# Patient Record
Sex: MALE | Race: ASIAN | NOT HISPANIC OR LATINO | Employment: FULL TIME | URBAN - METROPOLITAN AREA
[De-identification: names, ages, dates, MRNs, and addresses within clinical notes are randomized per-mention and may not be internally consistent; named-entity substitution may affect disease eponyms.]

---

## 2017-08-29 NOTE — PROGRESS NOTES
SUBJECTIVE:   Juan A Viramontes is a 46 year old male who presents to clinic today for the following health issues:      Joint Pain    Onset: 2+ months    Description:   Location: bilateral ankle pain right ankle pain has increased  Character: in the morning sharp pain --turns to dull ache throughout the day    Intensity: moderate    Progression of Symptoms: worse    Accompanying Signs & Symptoms:  Other symptoms: none    History:   Previous similar pain: no       Precipitating factors:   Trauma or overuse: no     Alleviating factors:  Improved by: nothing    Therapies Tried and outcome: none    Was seen at [facility]. Physical therapy did not help.           Social History   Substance Use Topics     Smoking status: Never Smoker     Smokeless tobacco: Not on file     Alcohol use No          Problem list and histories reviewed & adjusted, as indicated.  Additional history: as documented    BP Readings from Last 3 Encounters:   08/31/17 101/66   06/06/11 118/74   06/07/10 126/84    Wt Readings from Last 3 Encounters:   08/31/17 177 lb (80.3 kg)   06/07/10 175 lb (79.4 kg)   12/09/09 176 lb 9.6 oz (80.1 kg)                      Reviewed and updated as needed this visit by clinical staffTobacco  Allergies  Meds       Reviewed and updated as needed this visit by Provider         All other systems negative except as outline above  OBJECTIVE:  B/l heel pain.  FEET: tenderness to the inferomedial aspect of the effected heel(s)at the insertion of the plantar fascia.  EXTREMITIES: Tenderness over effected achilles tendon  and bursa without loss of integrity.  CMS intact.    Juan A was seen today for musculoskeletal problem.    Diagnoses and all orders for this visit:    Plantar fasciitis  -     XR Calcaneus Bilateral G/E 2 Views; Future  -     diclofenac (VOLTAREN) 75 MG EC tablet; Take 1 tablet (75 mg) by mouth 2 times daily as needed for moderate pain  -     order for DME; Heel lift    CARDIOVASCULAR SCREENING; LDL  GOAL LESS THAN 130  -     Lipid panel reflex to direct LDL; Future    Achilles tendinitis of both lower extremities  -     diclofenac (VOLTAREN) 75 MG EC tablet; Take 1 tablet (75 mg) by mouth 2 times daily as needed for moderate pain  -     order for DME; Heel lift      Advised supportive and symptomatic treatment.  Follow up with Provider - if condition persists or worsens.

## 2017-08-31 ENCOUNTER — RADIANT APPOINTMENT (OUTPATIENT)
Dept: GENERAL RADIOLOGY | Facility: CLINIC | Age: 47
End: 2017-08-31
Attending: PHYSICIAN ASSISTANT
Payer: COMMERCIAL

## 2017-08-31 ENCOUNTER — OFFICE VISIT (OUTPATIENT)
Dept: FAMILY MEDICINE | Facility: CLINIC | Age: 47
End: 2017-08-31
Payer: COMMERCIAL

## 2017-08-31 VITALS
BODY MASS INDEX: 26.83 KG/M2 | WEIGHT: 177 LBS | HEART RATE: 54 BPM | SYSTOLIC BLOOD PRESSURE: 101 MMHG | RESPIRATION RATE: 18 BRPM | TEMPERATURE: 98.2 F | DIASTOLIC BLOOD PRESSURE: 66 MMHG | OXYGEN SATURATION: 99 % | HEIGHT: 68 IN

## 2017-08-31 DIAGNOSIS — M72.2 PLANTAR FASCIITIS: Primary | ICD-10-CM

## 2017-08-31 DIAGNOSIS — M76.62 ACHILLES TENDINITIS OF BOTH LOWER EXTREMITIES: ICD-10-CM

## 2017-08-31 DIAGNOSIS — M76.61 ACHILLES TENDINITIS OF BOTH LOWER EXTREMITIES: ICD-10-CM

## 2017-08-31 DIAGNOSIS — M72.2 PLANTAR FASCIITIS: ICD-10-CM

## 2017-08-31 DIAGNOSIS — Z13.6 CARDIOVASCULAR SCREENING; LDL GOAL LESS THAN 130: ICD-10-CM

## 2017-08-31 PROCEDURE — 99213 OFFICE O/P EST LOW 20 MIN: CPT | Performed by: PHYSICIAN ASSISTANT

## 2017-08-31 PROCEDURE — 73650 X-RAY EXAM OF HEEL: CPT | Mod: RT

## 2017-08-31 RX ORDER — DICLOFENAC SODIUM 75 MG/1
75 TABLET, DELAYED RELEASE ORAL 2 TIMES DAILY PRN
Qty: 30 TABLET | Refills: 1 | Status: SHIPPED | OUTPATIENT
Start: 2017-08-31 | End: 2018-07-05

## 2017-08-31 NOTE — MR AVS SNAPSHOT
"              After Visit Summary   8/31/2017    Juan A Viramontes    MRN: [id number]           Patient Information     Date Of Birth          1970        Visit Information        Provider Department      8/31/2017 8:00 AM Pedro Luis Rivera PA-C [facility]        Today's Diagnoses     Plantar fasciitis    -  1    CARDIOVASCULAR SCREENING; LDL GOAL LESS THAN 130        Achilles tendinitis of both lower extremities           Follow-ups after your visit        Future tests that were ordered for you today     Open Future Orders        Priority Expected Expires Ordered    Lipid panel reflex to direct LDL Routine  8/31/2018 8/31/2017    XR Calcaneus Bilateral G/E 2 Views Routine 8/31/2017 8/31/2018 8/31/2017            Who to contact     Normal or non-critical lab and imaging results will be communicated to you by [employer], letter or phone within 4 business days after the clinic has received the results. If you do not hear from us within 7 days, please contact the clinic through Exper[employer]ity[employer] or phone. If you have a critical or abnormal lab result, we will notify you by phone as soon as possible.  Submit refill requests through [employer] or call your pharmacy and they will forward the refill request to us. Please allow 3 business days for your refill to be completed.          If you need to speak with a  for additional information , please call: [phone]             Additional Information About Your Visit        [employer] Information     [employer] lets you send messages to your doctor, view your test results, renew your prescriptions, schedule appointments and more. To sign up, go to [email or web address] . Click on \"Log in\" on the left side of the screen, which will take you to the Welcome page. Then click on \"Sign up Now\" on the right side of the page.     You will be asked to enter the access code listed below, as well as some personal information. Please follow the directions to create " "your username and password.     Your access code is: [email or web address]  Expires: 2017  8:48 AM     Your access code will  in 90 days. If you need help or a new code, please call your [facility] or [phone].        Care EveryWhere ID     This is your Care EveryWhere ID. This could be used by other organizations to access your [facility] medical records  [id number]        Your Vitals Were     Pulse Temperature Respirations Height Pulse Oximetry BMI (Body Mass Index)    54 98.2  F (36.8  C) (Tympanic) 18 5' 8\" (1.727 m) 99% 26.91 kg/m2       Blood Pressure from Last 3 Encounters:   17 101/66   11 118/74   06/07/10 126/84    Weight from Last 3 Encounters:   17 177 lb (80.3 kg)   06/07/10 175 lb (79.4 kg)   09 176 lb 9.6 oz (80.1 kg)                 Today's Medication Changes          These changes are accurate as of: 17  8:48 AM.  If you have any questions, ask your nurse or doctor.               Start taking these medicines.        Dose/Directions    diclofenac 75 MG EC tablet   Commonly known as:  VOLTAREN   Used for:  Plantar fasciitis, Achilles tendinitis of both lower extremities   Started by:  Pedro Luis Rivera PA-C        Dose:  75 mg   Take 1 tablet (75 mg) by mouth 2 times daily as needed for moderate pain   Quantity:  30 tablet   Refills:  1       order for DME   Used for:  Achilles tendinitis of both lower extremities, Plantar fasciitis   Started by:  Pedro Luis Rivera PA-C        Heel lift   Quantity:  2 Device   Refills:  0         Stop taking these medicines if you haven't already. Please contact your care team if you have questions.     ibuprofen 800 MG tablet   Commonly known as:  ADVIL/MOTRIN   Stopped by:  Pedro Luis Rivera PA-C           naproxen 500 MG tablet   Commonly known as:  NAPROSYN   Stopped by:  Pedro Luis Rivera PA-C           NO ACTIVE MEDICATIONS   Stopped by:  Pedro Luis Rivera PA-C           simvastatin 20 MG tablet   Commonly known as:  " ZOCOR   Stopped by:  Pedro Luis Rivera PA-C                Where to get your medicines      These medications were sent to [id number] [place] - [address]  [address], [place] [address]     Phone:  [phone]     diclofenac 75 MG EC tablet         Some of these will need a paper prescription and others can be bought over the counter.  Ask your nurse if you have questions.     Bring a paper prescription for each of these medications     order for DME                Primary Care Provider Office Phone # Fax #    Charles Gtz [phone]       [facility] [address] [facility] [address]        Equal Access to Services     DOUGLAS CARRANZA : Hadii ponce stein hadasho Soomaali, waaxda luqadaha, qaybta kaalmada adeegyada, ana tineo . So [facility] [phone].    ATENCIÓN: Si habla español, tiene a cooper disposición servicios gratuitos de asistencia lingüística. Llame al [phone].    We comply with applicable federal civil rights laws and [place] laws. We do not discriminate on the basis of race, color, national origin, age, disability sex, sexual orientation or gender identity.            Thank you!     Thank you for choosing [facility]  for your care. Our goal is always to provide you with excellent care. Hearing back from our patients is one way we can continue to improve our services. Please take a few minutes to complete the written survey that you may receive in the mail after your visit with us. Thank you!             Your Updated Medication List - Protect others around you: Learn how to safely use, store and throw away your medicines at www.disposemymeds.org.          This list is accurate as of: 8/31/17  8:48 AM.  Always use your most recent med list.                   Brand Name Dispense Instructions for use Diagnosis    diclofenac 75 MG EC tablet    VOLTAREN    30 tablet    Take 1 tablet (75 mg) by mouth 2 times  daily as needed for moderate pain    Plantar fasciitis, Achilles tendinitis of both lower extremities       order for DME     2 Device    Heel lift    Achilles tendinitis of both lower extremities, Plantar fasciitis

## 2018-06-21 ENCOUNTER — OFFICE VISIT (OUTPATIENT)
Dept: FAMILY MEDICINE | Facility: CLINIC | Age: 48
End: 2018-06-21
Payer: COMMERCIAL

## 2018-06-21 ENCOUNTER — RADIANT APPOINTMENT (OUTPATIENT)
Dept: ULTRASOUND IMAGING | Facility: CLINIC | Age: 48
End: 2018-06-21
Attending: FAMILY MEDICINE
Payer: COMMERCIAL

## 2018-06-21 VITALS
HEART RATE: 94 BPM | DIASTOLIC BLOOD PRESSURE: 92 MMHG | WEIGHT: 173 LBS | BODY MASS INDEX: 26.22 KG/M2 | RESPIRATION RATE: 16 BRPM | SYSTOLIC BLOOD PRESSURE: 132 MMHG | TEMPERATURE: 98.1 F | HEIGHT: 68 IN | OXYGEN SATURATION: 99 %

## 2018-06-21 DIAGNOSIS — N45.1 EPIDIDYMITIS: ICD-10-CM

## 2018-06-21 DIAGNOSIS — N45.1 EPIDIDYMITIS: Primary | ICD-10-CM

## 2018-06-21 PROCEDURE — 76870 US EXAM SCROTUM: CPT

## 2018-06-21 PROCEDURE — 93976 VASCULAR STUDY: CPT

## 2018-06-21 PROCEDURE — 99213 OFFICE O/P EST LOW 20 MIN: CPT | Performed by: FAMILY MEDICINE

## 2018-06-21 RX ORDER — IBUPROFEN 800 MG/1
800 TABLET, FILM COATED ORAL
Qty: 15 TABLET | Refills: 0 | Status: SHIPPED | OUTPATIENT
Start: 2018-06-21 | End: 2018-06-27

## 2018-06-21 RX ORDER — DOXYCYCLINE 100 MG/1
100 CAPSULE ORAL 2 TIMES DAILY
Qty: 20 CAPSULE | Refills: 0 | Status: SHIPPED | OUTPATIENT
Start: 2018-06-21 | End: 2018-07-05

## 2018-06-21 ASSESSMENT — PAIN SCALES - GENERAL: PAINLEVEL: SEVERE PAIN (7)

## 2018-06-21 NOTE — PROGRESS NOTES
"HPI:    I am seeing Juan A Viramontes for the 1st time. he is a 47 year old male here to discuss:    Right Testicle pain -     Duration: since 6/20/18  Trauma: no  Severity: severe  Quality: sharp  Swelling: no  Urinary symptoms: No dysuria, frequency, d/c or hematuria.  Concern for STD: no  Fevers: no  Better with: rest  Worse with: activity  Plan for today: u/s to rule out torsion. Treat with Doxy. Ibuprofen. Wear supportive underwear.        Exam:    BP (!) 132/92 (Cuff Size: Adult Regular)  Pulse 94  Temp 98.1  F (36.7  C) (Oral)  Resp 16  Ht 5' 8\" (1.727 m)  Wt 173 lb (78.5 kg)  SpO2 99%  BMI 26.3 kg/m2    Gen: Healthy appearing male in no acute distress  Abd: Soft, non tender, non distended, with normal bowel sounds. No liver or spleen enlargement. No masses. No hernias.  : both testicles are descended. No hernias. There is severe tenderness of the right testicle and surrounding soft tissues. No appreciable swelling.      Assessment and Plan - Decision Making    1. Epididymitis  Per HPI  - doxycycline (VIBRAMYCIN) 100 MG capsule; Take 1 capsule (100 mg) by mouth 2 times daily  Dispense: 20 capsule; Refill: 0  - US Testicular & Scrotum w Doppler Ltd; Future  - ibuprofen (ADVIL/MOTRIN) 800 MG tablet; Take 1 tablet (800 mg) by mouth 3 times daily (with meals)  Dispense: 15 tablet; Refill: 0      Written instructions given as follows:    Patient Instructions   1. Doxycycline as prescribed - may cause sun sensitivity.    2. Set up ultrasound - [phone].    3. Let me see you back for a physical with fasting labs.      "

## 2018-06-21 NOTE — NURSING NOTE
"Chief Complaint   Patient presents with     Abdominal Pain     Testicular/scrotal Pain       Initial BP (!) 132/92 (Cuff Size: Adult Regular)  Pulse 94  Temp 98.1  F (36.7  C) (Oral)  Resp 16  Ht 5' 8\" (1.727 m)  Wt 173 lb (78.5 kg)  SpO2 99%  BMI 26.3 kg/m2 Estimated body mass index is 26.3 kg/(m^2) as calculated from the following:    Height as of this encounter: 5' 8\" (1.727 m).    Weight as of this encounter: 173 lb (78.5 kg).      Chen Livingston LPN    "

## 2018-06-21 NOTE — PATIENT INSTRUCTIONS
1. Doxycycline as prescribed - may cause sun sensitivity.    2. Set up ultrasound - [phone].    3. Let me see you back for a physical with fasting labs.

## 2018-06-21 NOTE — MR AVS SNAPSHOT
"              After Visit Summary   6/21/2018    Juan A Viramontes    MRN: [id number]           Patient Information     Date Of Birth          1970        Visit Information        Provider Department      6/21/2018 12:30 PM Roge Mccormick MD [facility]        Today's Diagnoses     Epididymitis    -  1      Care Instructions    1. Doxycycline as prescribed - may cause sun sensitivity.    2. Set up ultrasound - [phone].    3. Let me see you back for a physical with fasting labs.          Follow-ups after your visit        Future tests that were ordered for you today     Open Future Orders        Priority Expected Expires Ordered    US Testicular & Scrotum w Doppler Ltd Routine  6/21/2019 6/21/2018            Who to contact     If you have questions or need follow up information about today's clinic visit or your schedule please contact [facility] directly at [phone].  Normal or non-critical lab and imaging results will be communicated to you by MyChart, letter or phone within 4 business days after the clinic has received the results. If you do not hear from us within 7 days, please contact the clinic through MyChart or phone. If you have a critical or abnormal lab result, we will notify you by phone as soon as possible.  Submit refill requests through [employer] or call your pharmacy and they will forward the refill request to us. Please allow 3 business days for your refill to be completed.          Additional Information About Your Visit        Care EveryWhere ID     This is your Care EveryWhere ID. This could be used by other organizations to access your [facility] medical records  [id number]        Your Vitals Were     Pulse Temperature Respirations Height Pulse Oximetry BMI (Body Mass Index)    94 98.1  F (36.7  C) (Oral) 16 5' 8\" (1.727 m) 99% 26.3 kg/m2       Blood Pressure from Last 3 Encounters:   06/21/18 (!) 132/92   08/31/17 101/66   06/06/11 118/74    Weight " from Last 3 Encounters:   06/21/18 173 lb (78.5 kg)   08/31/17 177 lb (80.3 kg)   06/07/10 175 lb (79.4 kg)                 Today's Medication Changes          These changes are accurate as of 6/21/18  1:09 PM.  If you have any questions, ask your nurse or doctor.               Start taking these medicines.        Dose/Directions    doxycycline 100 MG capsule   Commonly known as:  VIBRAMYCIN   Used for:  Epididymitis   Started by:  Roge Mccormick MD        Dose:  100 mg   Take 1 capsule (100 mg) by mouth 2 times daily   Quantity:  20 capsule   Refills:  0            Where to get your medicines      These medications were sent to [id number] - [place], [place] - [address] [facility]  [address], [place] [address]     Phone:  [phone]     doxycycline 100 MG capsule                Primary Care Provider    Charles Gtz MD       No address on file        Equal Access to Services     [facility]: Hadii ponce stein hadasho [place], waaxda luqadaha, qaybta kaalmada adeegyada, ana tineo . So [facility] [phone].    ATENCIÓN: Si habla español, tiene a cooper disposición servicios gratuitos de asistencia lingüística. Llame al [phone].    We comply with applicable federal civil rights laws and [place] laws. We do not discriminate on the basis of race, color, national origin, age, disability, sex, sexual orientation, or gender identity.            Thank you!     Thank you for choosing [facility]  for your care. Our goal is always to provide you with excellent care. Hearing back from our patients is one way we can continue to improve our services. Please take a few minutes to complete the written survey that you may receive in the mail after your visit with us. Thank you!             Your Updated Medication List - Protect others around you: Learn how to safely use, store and throw away your medicines at www.disposemymeds.org.           This list is accurate as of 6/21/18  1:09 PM.  Always use your most recent med list.                   Brand Name Dispense Instructions for use Diagnosis    diclofenac 75 MG EC tablet    VOLTAREN    30 tablet    Take 1 tablet (75 mg) by mouth 2 times daily as needed for moderate pain    Plantar fasciitis, Achilles tendinitis of both lower extremities       doxycycline 100 MG capsule    VIBRAMYCIN    20 capsule    Take 1 capsule (100 mg) by mouth 2 times daily    Epididymitis

## 2018-06-22 ENCOUNTER — TELEPHONE (OUTPATIENT)
Dept: FAMILY MEDICINE | Facility: CLINIC | Age: 48
End: 2018-06-22

## 2018-06-22 NOTE — TELEPHONE ENCOUNTER
Pt notified of provider message as written.  Pt verbalized good understanding. Appointment's scheduled.  Tiana MATTHEWSN, RN

## 2018-06-22 NOTE — TELEPHONE ENCOUNTER
Left message on answering machine for patient to call back to [phone].  Tiana Emerson BSN, RN

## 2018-06-22 NOTE — TELEPHONE ENCOUNTER
Please call patient:    Your ultrasound did not show any torsion (the main reason we did the test).    Small cysts were seen - not worrisome.    See you soon for a physical with fasting labs.    Roge Mccormick M.D.

## 2018-06-27 ENCOUNTER — TELEPHONE (OUTPATIENT)
Dept: FAMILY MEDICINE | Facility: CLINIC | Age: 48
End: 2018-06-27

## 2018-06-27 DIAGNOSIS — N45.1 EPIDIDYMITIS: ICD-10-CM

## 2018-06-27 RX ORDER — IBUPROFEN 800 MG/1
800 TABLET, FILM COATED ORAL
Qty: 15 TABLET | Refills: 0 | Status: SHIPPED | OUTPATIENT
Start: 2018-06-27 | End: 2018-07-05

## 2018-06-27 NOTE — TELEPHONE ENCOUNTER
Pt called RN directly.  He does have fasting lab appointment Friday and ov with Dr. Roge Mccormick on 7/2.  Pt asking for refill of ibuprofen, refill done per RN protocol.      Pt states he pain is a lot less but he still experiences pain when he is not taking ibuprofen. Ibuprofen controls the pain.  Pt asking if he should be concerned or needs to do anything different.  To provider to advise.  Tiana MATTHEWSN, RN

## 2018-06-29 DIAGNOSIS — Z13.6 CARDIOVASCULAR SCREENING; LDL GOAL LESS THAN 130: ICD-10-CM

## 2018-06-29 LAB
CHOLEST SERPL-MCNC: 262 MG/DL
HDLC SERPL-MCNC: 49 MG/DL
LDLC SERPL CALC-MCNC: 170 MG/DL
NONHDLC SERPL-MCNC: 213 MG/DL
TRIGL SERPL-MCNC: 217 MG/DL

## 2018-06-29 PROCEDURE — 36415 COLL VENOUS BLD VENIPUNCTURE: CPT | Performed by: PHYSICIAN ASSISTANT

## 2018-06-29 PROCEDURE — 80061 LIPID PANEL: CPT | Performed by: PHYSICIAN ASSISTANT

## 2018-07-03 ENCOUNTER — OFFICE VISIT (OUTPATIENT)
Dept: FAMILY MEDICINE | Facility: CLINIC | Age: 48
End: 2018-07-03
Payer: COMMERCIAL

## 2018-07-03 VITALS
RESPIRATION RATE: 16 BRPM | SYSTOLIC BLOOD PRESSURE: 119 MMHG | HEART RATE: 86 BPM | WEIGHT: 175 LBS | TEMPERATURE: 97.7 F | DIASTOLIC BLOOD PRESSURE: 76 MMHG | OXYGEN SATURATION: 97 % | BODY MASS INDEX: 26.52 KG/M2 | HEIGHT: 68 IN

## 2018-07-03 DIAGNOSIS — Z00.00 ROUTINE GENERAL MEDICAL EXAMINATION AT A HEALTH CARE FACILITY: Primary | ICD-10-CM

## 2018-07-03 PROCEDURE — 99396 PREV VISIT EST AGE 40-64: CPT | Performed by: FAMILY MEDICINE

## 2018-07-03 NOTE — PROGRESS NOTES
SUBJECTIVE:   CC: Juan A Viramontes is an 47 year old male who presents for preventative health visit.     Physical   Annual:     Getting at least 3 servings of Calcium per day:  Yes    Bi-annual eye exam:  Yes    Dental care twice a year:  NO    Sleep apnea or symptoms of sleep apnea:  None    Diet:  Regular (no restrictions)    Frequency of exercise:  2-3 days/week    Duration of exercise:  15-30 minutes    Taking medications regularly:  Yes    Medication side effects:  None    Additional concerns today:  No      Right Testicle pain - occurred in June 2018.   Evaluation and treatment:    U/s 6/21/18 as below.   Resolved with Ibuprofen and Doxy.    IMPRESSION: Small bilateral epididymal cysts. Otherwise unremarkable  exam.    Dyslipidemia - no h/o vascular disease like CAD, PAD, CVA and no h/o diabetes.   Evaluation and treatment:    Per ATP, no statin recommended.   Counseling done today regarding healthy weight, diet and exercise.     Recent Labs   Lab Test  06/29/18   0729   CHOL  262*   HDL  49   LDL  170*   TRIG  217*     The 10-year ASCVD risk score (Keyanna LIANA Jr, et al., 2013) is: 3.2%    Values used to calculate the score:      Age: 47 years      Sex: Male      Is Non- : No      Diabetic: No      Tobacco smoker: No      Systolic Blood Pressure: 119 mmHg      Is BP treated: No      HDL Cholesterol: 49 mg/dL      Total Cholesterol: 262 mg/dL    Preventive -     Immunization History   Administered Date(s) Administered     Influenza (IIV3) PF 11/12/2009     -STD screen: declines    -screen diabetes: we had not ordered it with pre-visit labs. I apologized. He is not fasting today so I ordered future BMP.      SH:    Marital status:   Kids: no  Employment: office work  Exercise: elliptical 3 per week about 25-30 min  Tobacco: no  Etoh: no  Recreational drugs: no  Caffeine: coffee 2-3 per week    Written instructions given as follows:    Patient Instructions     Preventive Health  Recommendations  Male Ages 40 to 49    Yearly exam:             See your health care provider every year in order to  o   Review health changes.   o   Discuss preventive care.    o   Review your medicines if your doctor has prescribed any.    You should be tested each year for STDs (sexually transmitted diseases) if you re at risk.     Have a cholesterol test every 5 years.     Have a colonoscopy (test for colon cancer) if someone in your family has had colon cancer or polyps before age 50.     After age 45, have a diabetes test (fasting glucose). If you are at risk for diabetes, you should have this test every 3 years.      Talk with your health care provider about whether or not a prostate cancer screening test (PSA) is right for you.    Shots: Get a flu shot each year. Get a tetanus shot every 10 years.     Nutrition:    Eat at least 5 servings of fruits and vegetables daily.     Eat whole-grain bread, whole-wheat pasta and brown rice instead of white grains and rice.     Get adequate Calcium and Vitamin D.     Lifestyle    Exercise for at least 150 minutes a week (30 minutes a day, 5 days a week). This will help you control your weight and prevent disease.     Limit alcohol to one drink per day.     No smoking.     Wear sunscreen to prevent skin cancer.     See your dentist every six months for an exam and cleaning.            Today's PHQ-2 Score:   PHQ-2 ( 1999 Pfizer) 7/3/2018   Q1: Little interest or pleasure in doing things 0   Q2: Feeling down, depressed or hopeless 0   PHQ-2 Score 0   Q1: Little interest or pleasure in doing things Not at all   Q2: Feeling down, depressed or hopeless Not at all   PHQ-2 Score 0       Abuse: Current or Past(Physical, Sexual or Emotional)- No  Do you feel safe in your environment - Yes    Social History   Substance Use Topics     Smoking status: Never Smoker     Smokeless tobacco: Never Used     Alcohol use No     Alcohol Use 7/3/2018   If you drink alcohol do you typically  "have greater than 3 drinks per day OR greater than 7 drinks per week? Not Applicable   No flowsheet data found.    Last PSA: No results found for: PSA    Reviewed orders with patient. Reviewed health maintenance and updated orders accordingly - Yes        Review of Systems  CONSTITUTIONAL: NEGATIVE for fever, chills, change in weight  INTEGUMENTARY/SKIN: NEGATIVE for worrisome rashes, moles or lesions  EYES: NEGATIVE for vision changes or irritation  ENT: NEGATIVE for ear, mouth and throat problems  RESP: NEGATIVE for significant cough or SOB  CV: NEGATIVE for chest pain, palpitations or peripheral edema  GI: NEGATIVE for nausea, abdominal pain, heartburn, or change in bowel habits   male: negative for dysuria, hematuria, decreased urinary stream, erectile dysfunction, urethral discharge  MUSCULOSKELETAL: NEGATIVE for significant arthralgias or myalgia  NEURO: NEGATIVE for weakness, dizziness or paresthesias  PSYCHIATRIC: NEGATIVE for changes in mood or affect    OBJECTIVE:   /76 (Cuff Size: Adult Regular)  Pulse 86  Temp 97.7  F (36.5  C) (Oral)  Resp 16  Ht 5' 8\" (1.727 m)  Wt 175 lb (79.4 kg)  SpO2 97%  BMI 26.61 kg/m2    Physical Exam  GENERAL: healthy, alert and no distress  EYES: Eyes grossly normal to inspection, PERRL and conjunctivae and sclerae normal  HENT: ear canals and TM's normal, nose and mouth without ulcers or lesions  NECK: no adenopathy, no asymmetry, masses, or scars and thyroid normal to palpation  RESP: lungs clear to auscultation - no rales, rhonchi or wheezes  CV: regular rate and rhythm, normal S1 S2, no S3 or S4, no murmur, click or rub, no peripheral edema and peripheral pulses strong  ABDOMEN: soft, nontender, no hepatosplenomegaly, no masses and bowel sounds normal  MS: no gross musculoskeletal defects noted, no edema  SKIN: no suspicious lesions or rashes  NEURO: Normal strength and tone, mentation intact and speech normal  PSYCH: mentation appears normal, affect " "normal/bright        ASSESSMENT/PLAN:       COUNSELING:   Reviewed preventive health counseling, as reflected in patient instructions       Regular exercise       Healthy diet/nutrition    BP Readings from Last 1 Encounters:   07/03/18 119/76     Estimated body mass index is 26.3 kg/(m^2) as calculated from the following:    Height as of 6/21/18: 5' 8\" (1.727 m).    Weight as of 6/21/18: 173 lb (78.5 kg).         reports that he has never smoked. He has never used smokeless tobacco.      Assessment and Plan - Decision Making    1. Routine general medical examination at a health care facility  Results of today's exam given to patient verbally along with age appropriate preventive measures. Written instructions reviewed and handed to the patient.    - Basic metabolic panel; Future      Written instructions given as follows:    Patient Instructions   1. I recommend including veggies, fresh fruits (3 to 5 servings), nuts (unsalted) in your daily diet. Cut down on carbs like bread, pasta, rice, potatoes. Cut down on red meats like burgers etc. Increase healthy proteins like beans, tofu, tuna, chicken and turkey.    2. Get regular exercise like jogging, biking, swimming at least 3 times per week.      3. See the dentist and eye doctor regularly.     4. Sorry that you have to come back one more time to give fasting blood sample.    5. I will contact you with results - if all is well see you in one year for a physical with fasting labs.                 "

## 2018-07-03 NOTE — MR AVS SNAPSHOT
After Visit Summary   7/3/2018    Juan A Viramontes    MRN: [id number]           Patient Information     Date Of Birth          1970        Visit Information        Provider Department      7/3/2018 6:00 PM Roge Mccormick MD [facility]        Today's Diagnoses     Routine general medical examination at a health care facility    -  1      Care Instructions    1. I recommend including veggies, fresh fruits (3 to 5 servings), nuts (unsalted) in your daily diet. Cut down on carbs like bread, pasta, rice, potatoes. Cut down on red meats like burgers etc. Increase healthy proteins like beans, tofu, tuna, chicken and turkey.    2. Get regular exercise like jogging, biking, swimming at least 3 times per week.      3. See the dentist and eye doctor regularly.     4. Sorry that you have to come back one more time to give fasting blood sample.    5. I will contact you with results - if all is well see you in one year for a physical with fasting labs.                     Follow-ups after your visit        Future tests that were ordered for you today     Open Future Orders        Priority Expected Expires Ordered    Basic metabolic panel Routine  7/3/2019 7/3/2018            Who to contact     If you have questions or need follow up information about today's clinic visit or your schedule please contact [facility] directly at [phone].  Normal or non-critical lab and imaging results will be communicated to you by MyChart, letter or phone within 4 business days after the clinic has received the results. If you do not hear from us within 7 days, please contact the clinic through MyChart or phone. If you have a critical or abnormal lab result, we will notify you by phone as soon as possible.  Submit refill requests through [employer] or call your pharmacy and they will forward the refill request to us. Please allow 3 business days for your refill to be completed.           "Additional Information About Your Visit        Care EveryWhere ID     This is your Care EveryWhere ID. This could be used by other organizations to access your [place] medical records  [id number]        Your Vitals Were     Pulse Temperature Respirations Height Pulse Oximetry BMI (Body Mass Index)    86 97.7  F (36.5  C) (Oral) 16 5' 8\" (1.727 m) 97% 26.61 kg/m2       Blood Pressure from Last 3 Encounters:   07/03/18 119/76   06/21/18 (!) 132/92   08/31/17 101/66    Weight from Last 3 Encounters:   07/03/18 175 lb (79.4 kg)   06/21/18 173 lb (78.5 kg)   08/31/17 177 lb (80.3 kg)               Primary Care Provider Office Phone # Fax #    [facility] [phone] [address] [facility] [address]        Equal Access to Services     DOUGLAS CARRANZA : Hadii ponce ku hadasho Soomaali, waaxda luqadaha, qaybta kaalmada adeegyada, waxay anetain hayrenon julius tineo . So [facility] [phone].    ATENCIÓN: Si habla español, tiene a cooper disposición servicios gratuitos de asistencia lingüística. Llame al [phone].    We comply with applicable federal civil rights laws and [place] laws. We do not discriminate on the basis of race, color, national origin, age, disability, sex, sexual orientation, or gender identity.            Thank you!     Thank you for choosing [facility]  for your care. Our goal is always to provide you with excellent care. Hearing back from our patients is one way we can continue to improve our services. Please take a few minutes to complete the written survey that you may receive in the mail after your visit with us. Thank you!             Your Updated Medication List - Protect others around you: Learn how to safely use, store and throw away your medicines at www.disposemymeds.org.          This list is accurate as of 7/3/18  6:22 PM.  Always use your most recent med list.                   Brand Name Dispense Instructions for use Diagnosis    " diclofenac 75 MG EC tablet    VOLTAREN    30 tablet    Take 1 tablet (75 mg) by mouth 2 times daily as needed for moderate pain    Plantar fasciitis, Achilles tendinitis of both lower extremities       doxycycline 100 MG capsule    VIBRAMYCIN    20 capsule    Take 1 capsule (100 mg) by mouth 2 times daily    Epididymitis       ibuprofen 800 MG tablet    ADVIL/MOTRIN    15 tablet    Take 1 tablet (800 mg) by mouth 3 times daily (with meals)    Epididymitis

## 2018-07-03 NOTE — NURSING NOTE
"Chief Complaint   Patient presents with     Physical       Initial /76 (Cuff Size: Adult Regular)  Pulse 86  Temp 97.7  F (36.5  C) (Oral)  Resp 16  Ht 5' 8\" (1.727 m)  Wt 175 lb (79.4 kg)  SpO2 97%  BMI 26.61 kg/m2 Estimated body mass index is 26.61 kg/(m^2) as calculated from the following:    Height as of this encounter: 5' 8\" (1.727 m).    Weight as of this encounter: 175 lb (79.4 kg).      Chen Livingston LPN    "

## 2018-07-03 NOTE — PATIENT INSTRUCTIONS
1. I recommend including veggies, fresh fruits (3 to 5 servings), nuts (unsalted) in your daily diet. Cut down on carbs like bread, pasta, rice, potatoes. Cut down on red meats like burgers etc. Increase healthy proteins like beans, tofu, tuna, chicken and turkey.    2. Get regular exercise like jogging, biking, swimming at least 3 times per week.      3. See the dentist and eye doctor regularly.     4. Sorry that you have to come back one more time to give fasting blood sample.    5. I will contact you with results - if all is well see you in one year for a physical with fasting labs.

## 2018-07-04 ENCOUNTER — TELEPHONE (OUTPATIENT)
Dept: FAMILY MEDICINE | Facility: CLINIC | Age: 48
End: 2018-07-04

## 2018-07-04 ENCOUNTER — NURSE TRIAGE (OUTPATIENT)
Dept: NURSING | Facility: CLINIC | Age: 48
End: 2018-07-04

## 2018-07-04 DIAGNOSIS — N45.1 EPIDIDYMITIS: ICD-10-CM

## 2018-07-04 DIAGNOSIS — N50.819 TESTICLE PAIN: Primary | ICD-10-CM

## 2018-07-04 NOTE — TELEPHONE ENCOUNTER
"Patient experienced \"pain in testes\" 10 days ago.  Saw PCP, had ultrasound, and was given antibiotics.  Pain resolved.    Pain has returned this morning.   Rates pain as 4-5/10.  Took an Ibuprofen 800 mg 1/2 hour ago.  Has not yet noticed any changes in the pain.  Denies fever  Denies swelling, redness, open sores.  Feels pain is the same as it was previous.    Patient would like to update Dr. Mccormick and wonders how should he proceed?  Does he need more antibiotics?  Should he see a urologist?    Will route a message to Dr. Mccormick to address patient's concerns.  Advised patient to call clinic if he does not hear from PCP by 12 noon tomorrow.    Protocol and care advice reviewed  Caller states understanding of the recommended disposition  Advised to call back if further questions or concerns      Reason for Disposition    [1] Brief pain in scrotum or testicle AND [2] present < 1 hour AND [3] recurrent  (NO swelling)    Additional Information    Negative: SEVERE pain (e.g., excruciating)    Negative: Swollen scrotum    Negative: Vomiting    Negative: [1] Constant pain in scrotum or testicle AND [2] present > 1 hour    Negative: Fever > 100.5 F (38.1 C)    Negative: Patient sounds very sick or weak to the triager    Negative: Scrotum looks infected (e.g., draining sore, ulcer, red rash)    Negative: Blood in urine (red, pink, or tea-colored)    Protocols used: SCROTAL PAIN-ADULT-      "

## 2018-07-04 NOTE — TELEPHONE ENCOUNTER
"Clinic Action Needed:  Yes, please call patient  [facility]NA Triage Call  Presenting Problem:    Patient experienced \"pain in testes\" 10 days ago.  Saw PCP, had ultrasound, and was given antibiotics.  Pain resolved.    Pain has returned this morning.   Rates pain as 4-5/10.  Took an Ibuprofen 800 mg 1/2 hour ago.  Has not yet noticed any changes in the pain.  Denies fever  Denies swelling, redness, open sores.  Feels pain is the same as it was previous.    Patient would like to update Dr. Mccormick and wonders how should he proceed?  Does he need more antibiotics?  Should he see a urologist?    Will route a message to Dr. Mccormick to address patient's concerns.  Advised patient to call clinic if he does not hear from PCP  by 12 noon tomorrow.      Routed to:  Dr. Mccormick /Nurse Diana Chua RN / [place] [facility]    "

## 2018-07-05 ENCOUNTER — TELEPHONE (OUTPATIENT)
Dept: FAMILY MEDICINE | Facility: CLINIC | Age: 48
End: 2018-07-05

## 2018-07-05 RX ORDER — IBUPROFEN 800 MG/1
800 TABLET, FILM COATED ORAL
Qty: 15 TABLET | Refills: 0 | Status: SHIPPED | OUTPATIENT
Start: 2018-07-05

## 2018-07-05 RX ORDER — DOXYCYCLINE 100 MG/1
100 CAPSULE ORAL 2 TIMES DAILY
Qty: 20 CAPSULE | Refills: 0 | Status: SHIPPED | OUTPATIENT
Start: 2018-07-05

## 2018-07-05 NOTE — TELEPHONE ENCOUNTER
Yes see urology - [phone].    In the mean time, repeat the same treatment as before - Ibuprofen and Doxy sent to his pharmacy.    Also give him the message I sent to pool separately.    Roge Mccormick M.D.

## 2018-07-05 NOTE — TELEPHONE ENCOUNTER
Please call patient:    When you come for fasting labs on 7/10/18, please stop by our pharmacy and get Tdap vaccine.    Thanks.    Roge Mccormick M.D.

## 2018-07-05 NOTE — TELEPHONE ENCOUNTER
Pt notified of provider message as written.  Pt verbalized good understanding.  Tiana Emerson BSN, RN

## 2018-07-10 ENCOUNTER — ALLIED HEALTH/NURSE VISIT (OUTPATIENT)
Dept: NURSING | Facility: CLINIC | Age: 48
End: 2018-07-10
Payer: COMMERCIAL

## 2018-07-10 DIAGNOSIS — Z23 NEED FOR VACCINATION: Primary | ICD-10-CM

## 2018-07-10 DIAGNOSIS — Z00.00 ROUTINE GENERAL MEDICAL EXAMINATION AT A HEALTH CARE FACILITY: ICD-10-CM

## 2018-07-10 LAB
ANION GAP SERPL CALCULATED.3IONS-SCNC: 9 MMOL/L (ref 3–14)
BUN SERPL-MCNC: 16 MG/DL (ref 7–30)
CALCIUM SERPL-MCNC: 8.8 MG/DL (ref 8.5–10.1)
CHLORIDE SERPL-SCNC: 105 MMOL/L (ref 94–109)
CO2 SERPL-SCNC: 24 MMOL/L (ref 20–32)
CREAT SERPL-MCNC: 0.93 MG/DL (ref 0.66–1.25)
GFR SERPL CREATININE-BSD FRML MDRD: 88 ML/MIN/1.7M2
GLUCOSE SERPL-MCNC: 101 MG/DL (ref 70–99)
POTASSIUM SERPL-SCNC: 3.9 MMOL/L (ref 3.4–5.3)
SODIUM SERPL-SCNC: 138 MMOL/L (ref 133–144)

## 2018-07-10 PROCEDURE — 80048 BASIC METABOLIC PNL TOTAL CA: CPT | Performed by: FAMILY MEDICINE

## 2018-07-10 PROCEDURE — 90471 IMMUNIZATION ADMIN: CPT

## 2018-07-10 PROCEDURE — 90715 TDAP VACCINE 7 YRS/> IM: CPT

## 2018-07-10 PROCEDURE — 36415 COLL VENOUS BLD VENIPUNCTURE: CPT | Performed by: FAMILY MEDICINE

## 2018-07-10 NOTE — MR AVS SNAPSHOT
After Visit Summary   7/10/2018    Juan A Virmaontes    MRN: [id number]           Patient Information     Date Of Birth          1970        Visit Information        Provider Department      7/10/2018 8:15 AM AN ANCILLARY [facility]        Today's Diagnoses     Need for vaccination    -  1       Follow-ups after your visit        Who to contact     If you have questions or need follow up information about today's clinic visit or your schedule please contact [facility] directly at [phone].  Normal or non-critical lab and imaging results will be communicated to you by MyChart, letter or phone within 4 business days after the clinic has received the results. If you do not hear from us within 7 days, please contact the clinic through MyChart or phone. If you have a critical or abnormal lab result, we will notify you by phone as soon as possible.  Submit refill requests through [employer] or call your pharmacy and they will forward the refill request to us. Please allow 3 business days for your refill to be completed.          Additional Information About Your Visit        Care EveryWhere ID     This is your Care EveryWhere ID. This could be used by other organizations to access your [place] medical records  [id number]         Blood Pressure from Last 3 Encounters:   07/03/18 119/76   06/21/18 (!) 132/92   08/31/17 101/66    Weight from Last 3 Encounters:   07/03/18 175 lb (79.4 kg)   06/21/18 173 lb (78.5 kg)   08/31/17 177 lb (80.3 kg)              We Performed the Following     1st  Administration  [05846]     TDAP VACCINE (ADACEL) [40702.002]        Primary Care Provider Office Phone # Fax #    [facility] [phone] [address] [facility] [address]        Equal Access to Services     [facility] TERE AH: Rosendo Garcia, nadia lumariah, qaybta kaalbradford pearce, ana henderson. So [facility]  [phone].    ATENCIÓN: Si aleksandr gonzalez, tiene a cooper disposición servicios gratuitos de asistencia lingüística. Lala bauer [phone].    We comply with applicable federal civil rights laws and [place] laws. We do not discriminate on the basis of race, color, national origin, age, disability, sex, sexual orientation, or gender identity.            Thank you!     [facility]  [facility]. Our goal is always to provide you with excellent care. Hearing back from our patients is one way we can continue to improve our services. Please take a few minutes to complete the written survey that you may receive in the mail after your visit with us. Thank you!             Your Updated Medication List - Protect others around you: Learn how to safely use, store and throw away your medicines at www.disposemymeds.org.          This list is accurate as of 7/10/18  8:52 AM.  Always use your most recent med list.                   Brand Name Dispense Instructions for use Diagnosis    doxycycline 100 MG capsule    VIBRAMYCIN    20 capsule    Take 1 capsule (100 mg) by mouth 2 times daily    Testicle pain       ibuprofen 800 MG tablet    ADVIL/MOTRIN    15 tablet    Take 1 tablet (800 mg) by mouth 3 times daily (with meals)    Testicle pain

## 2018-07-10 NOTE — PROGRESS NOTES
Screening Questionnaire for Adult Immunization    Are you sick today?   No   Do you have allergies to medications, food, a vaccine component or latex?   No   Have you ever had a serious reaction after receiving a vaccination?   No   Do you have a long-term health problem with heart disease, lung disease, asthma, kidney disease, metabolic disease (e.g. diabetes), anemia, or other blood disorder?   No   Do you have cancer, leukemia, HIV/AIDS, or any other immune system problem?   No   In the past 3 months, have you taken medications that affect  your immune system, such as prednisone, other steroids, or anticancer drugs; drugs for the treatment of rheumatoid arthritis, Crohn s disease, or psoriasis; or have you had radiation treatments?   No   Have you had a seizure, or a brain or other nervous system problem?   No   During the past year, have you received a transfusion of blood or blood     products, or been given immune (gamma) globulin or antiviral drug?   No   For women: Are you pregnant or is there a chance you could become        pregnant during the next month?   No   Have you received any vaccinations in the past 4 weeks?   No     Immunization questionnaire answers were all negative.        Per orders of Dr. Mccormick, injection of tdap given by Ruth Ann De Souza. Patient instructed to remain in clinic for 15 minutes afterwards, and to report any adverse reaction to me immediately.       Screening performed by Ruth Ann De Souza on 7/10/2018 at 8:49 AM.

## 2020-02-08 ENCOUNTER — HEALTH MAINTENANCE LETTER (OUTPATIENT)
Age: 50
End: 2020-02-08

## 2020-11-08 ENCOUNTER — HEALTH MAINTENANCE LETTER (OUTPATIENT)
Age: 50
End: 2020-11-08

## 2021-03-28 ENCOUNTER — HEALTH MAINTENANCE LETTER (OUTPATIENT)
Age: 51
End: 2021-03-28

## 2021-06-16 ENCOUNTER — TELEPHONE (OUTPATIENT)
Dept: ORTHOPEDICS | Facility: CLINIC | Age: 51
End: 2021-06-16

## 2021-06-16 NOTE — TELEPHONE ENCOUNTER
RN called and left patient a detailed VM. Advising patient to see non op Sports Med for work up for his neck and back pain.  RN provided number for patient to schedule to see either Dr. Goff, Dr. Garcia or Dr. Khan.  If Patient calls back, please assist with scheduling.  Thank you    Nicko Chicas RN

## 2021-09-11 ENCOUNTER — HEALTH MAINTENANCE LETTER (OUTPATIENT)
Age: 51
End: 2021-09-11

## 2022-04-23 ENCOUNTER — HEALTH MAINTENANCE LETTER (OUTPATIENT)
Age: 52
End: 2022-04-23

## 2022-10-30 ENCOUNTER — HEALTH MAINTENANCE LETTER (OUTPATIENT)
Age: 52
End: 2022-10-30

## 2022-11-07 NOTE — PROGRESS NOTES
Juan A Viramontes  :  1970  DOS: 2022  MRN: [id number]    Sports Medicine Clinic Visit    PCP: Rehana - SUSAN Ornelas [place]    Juan A Viramontes is a 52 year old male who is seen as a self referral presenting with bilateral ankle/posterior heel pain    Injury: Reports insidious onset without acute precipitating event that patient first noticed approximately 2+ year(s) ago.  Pain located over bilateral posterior heels near Achilles insertion radiating into Achilles. Additional Features:  Positive: inflammation near Achilles insertion. Symptoms are better with rest/activity avoidance.  Symptoms are worse with: elliptical and extended periods of walking, weight bearing ankle dorsiflexion. Other evaluation and/or treatments so far consists of: insert, supportive shoes, Bengay. Prior imaging: No recent imaging completed.  Prior History of related problems: chronic issue    Social History: currently employed as [employer] department     Review of Systems  Musculoskeletal: as above  Remainder of review of systems is negative including constitutional, CV, pulmonary, GI, Skin and Neurologic except as noted in HPI or medical history.    No past medical history on file.  Past Surgical History:   Procedure Laterality Date     ZZC APPENDECTOMY  High School     Family History   Problem Relation Age of Onset     Hypertension Mother      Hypertension Father      Lipids Father        Objective  /74   Wt 81.2 kg (179 lb)   BMI 27.22 kg/m        General: healthy, alert and in no distress      HEENT: no scleral icterus or conjunctival erythema     Skin: no suspicious lesions or rash. No jaundice.     CV: regular rhythm by palpation, 2+ distal pulses, no pedal edema      Resp: normal respiratory effort without conversational dyspnea     Psych: normal mood and affect      Gait: mildly antalgic, appropriate coordination and balance     Neuro: normal light touch sensory exam of the  extremities. Motor strength as noted below     Bilateral Ankle/Foot Exam:    Inspection:       no visible ecchymosis       edema noted mildly at the insertion of Achilles tendon bilaterally       Normal DP artery pulse       Normal PT artery pulse    Foot inspection:       pes planus    ROM:        full ROM with dorsiflexion, plantarflexion, inversion and eversion       limited by pain with resisted plantarflexion    Tender:       lateral malleolus       lateral ankle ligaments       deltoid ligamen       proximal 5th metatarsal       dorsal tibiotalar joint       tarsal navicular       medial malleolus       distal tibiofibular joint       tibialis posterior tendon, posterior to medial malleolus       peroneal tendon sheath    Non-Tender:       remainder of foot and ankle    Skin:       well perfused       capillary refill less than 2 seconds      Radiology:  Recent Results (from the past 744 hour(s))   XR Ankle Bilateral G/E 3 vw    Narrative    ANKLE BILATERAL THREE OR MORE VIEWS  DATE/TIME: 11/9/2022 1:29 PM     INDICATION: Bilateral ankle pain.   COMPARISON: None.      Impression    IMPRESSION:  1.  Right ankle: Normal joint spacing and alignment. No fracture.  Moderate calcaneus enthesophytes.  2.  Left ankle: Normal joint spacing and alignment. No fracture. Small  calcaneus enthesophytes.    MACHELLE ABREU MD         SYSTEM ID:  [id number]         Assessment:  1. Heel pain, bilateral    2. Achilles tendinitis of both lower extremities    3. James's deformity of both heels    4. Enthesopathy of ankle        Plan:  Discussed the assessment with the patient.  Follow up: As needed based on clinical progress  5+ years of consistent insertional Achilles pain  Has exhausted most conservative care options including footwear, custom orthotics, topical anti-inflammatory, systemic anti-inflammatories, RICE, activity modification  Reviewed goal of flexibility and strength and bilateral calf muscles and posterior  chain  Physical therapy options reviewed in detail  Reviewed that he has signs of a very mild James's deformity which could predispose to friction at the insertion of the Achilles tendon  XR images independently visualized and reviewed with patient today in clinic  Large enesthophyte over the medial aspect of the insertional Achilles tendon on the right, quite tender to palpation, not amenable to percutaneous tenotomy  After discussion patient would like to discuss future options with the surgeon given the chronicity of the problem  Referral was placed today  We discussed modified progressive pain-free activity as tolerated  Home handouts provided and supportive care reviewed  All questions were answered today  Contact us with additional questions or concerns  Signs and sx of concern reviewed      Fidel Mon DO, NIR  Sports Medicine Physician  [facility]

## 2022-11-09 ENCOUNTER — OFFICE VISIT (OUTPATIENT)
Dept: ORTHOPEDICS | Facility: CLINIC | Age: 52
End: 2022-11-09
Payer: COMMERCIAL

## 2022-11-09 ENCOUNTER — ANCILLARY PROCEDURE (OUTPATIENT)
Dept: GENERAL RADIOLOGY | Facility: CLINIC | Age: 52
End: 2022-11-09
Attending: FAMILY MEDICINE
Payer: COMMERCIAL

## 2022-11-09 VITALS — SYSTOLIC BLOOD PRESSURE: 112 MMHG | WEIGHT: 179 LBS | DIASTOLIC BLOOD PRESSURE: 74 MMHG | BODY MASS INDEX: 27.22 KG/M2

## 2022-11-09 DIAGNOSIS — M79.671 HEEL PAIN, BILATERAL: ICD-10-CM

## 2022-11-09 DIAGNOSIS — M79.672 HEEL PAIN, BILATERAL: Primary | ICD-10-CM

## 2022-11-09 DIAGNOSIS — M76.62 ACHILLES TENDINITIS OF BOTH LOWER EXTREMITIES: ICD-10-CM

## 2022-11-09 DIAGNOSIS — M77.50 ENTHESOPATHY OF ANKLE: ICD-10-CM

## 2022-11-09 DIAGNOSIS — M79.672 HEEL PAIN, BILATERAL: ICD-10-CM

## 2022-11-09 DIAGNOSIS — M79.671 HEEL PAIN, BILATERAL: Primary | ICD-10-CM

## 2022-11-09 DIAGNOSIS — M92.62 HAGLUND'S DEFORMITY OF BOTH HEELS: ICD-10-CM

## 2022-11-09 DIAGNOSIS — M76.61 ACHILLES TENDINITIS OF BOTH LOWER EXTREMITIES: ICD-10-CM

## 2022-11-09 DIAGNOSIS — M92.61 HAGLUND'S DEFORMITY OF BOTH HEELS: ICD-10-CM

## 2022-11-09 PROCEDURE — 73610 X-RAY EXAM OF ANKLE: CPT | Mod: TC | Performed by: RADIOLOGY

## 2022-11-09 PROCEDURE — 99204 OFFICE O/P NEW MOD 45 MIN: CPT | Performed by: FAMILY MEDICINE

## 2022-11-09 NOTE — LETTER
2022         RE: Juan A Viramontes  [address]  [place] [address]        Dear Colleague,    Thank you for referring your patient, Juan A Viramontes, to the [facility] [place]. Please see a copy of my visit note below.    Juan A Viramontes  :  1970  DOS: 2022  MRN: [id number]    Sports Medicine Clinic Visit    PCP: Rehana Ornelas [facility]    Juan A Viramontes is a 52 year old male who is seen as a self referral presenting with bilateral ankle/posterior heel pain    Injury: Reports insidious onset without acute precipitating event that patient first noticed approximately 2+ year(s) ago.  Pain located over bilateral posterior heels near Achilles insertion radiating into Achilles. Additional Features:  Positive: inflammation near Achilles insertion. Symptoms are better with rest/activity avoidance.  Symptoms are worse with: elliptical and extended periods of walking, weight bearing ankle dorsiflexion. Other evaluation and/or treatments so far consists of: insert, supportive shoes, Bengay. Prior imaging: No recent imaging completed.  Prior History of related problems: chronic issue    Social History: currently employed as [employer] department     Review of Systems  Musculoskeletal: as above  Remainder of review of systems is negative including constitutional, CV, pulmonary, GI, Skin and Neurologic except as noted in HPI or medical history.    No past medical history on file.  Past Surgical History:   Procedure Laterality Date     ZZC APPENDECTOMY  High School     Family History   Problem Relation Age of Onset     Hypertension Mother      Hypertension Father      Lipids Father        Objective  /74   Wt 81.2 kg (179 lb)   BMI 27.22 kg/m        General: healthy, alert and in no distress      HEENT: no scleral icterus or conjunctival erythema     Skin: no suspicious lesions or rash. No jaundice.     CV: regular rhythm  by palpation, 2+ distal pulses, no pedal edema      Resp: normal respiratory effort without conversational dyspnea     Psych: normal mood and affect      Gait: mildly antalgic, appropriate coordination and balance     Neuro: normal light touch sensory exam of the extremities. Motor strength as noted below     Bilateral Ankle/Foot Exam:    Inspection:       no visible ecchymosis       edema noted mildly at the insertion of Achilles tendon bilaterally       Normal DP artery pulse       Normal PT artery pulse    Foot inspection:       pes planus    ROM:        full ROM with dorsiflexion, plantarflexion, inversion and eversion       limited by pain with resisted plantarflexion    Tender:       lateral malleolus       lateral ankle ligaments       deltoid ligamen       proximal 5th metatarsal       dorsal tibiotalar joint       tarsal navicular       medial malleolus       distal tibiofibular joint       tibialis posterior tendon, posterior to medial malleolus       peroneal tendon sheath    Non-Tender:       remainder of foot and ankle    Skin:       well perfused       capillary refill less than 2 seconds      Radiology:  Recent Results (from the past 744 hour(s))   XR Ankle Bilateral G/E 3 vw    Narrative    ANKLE BILATERAL THREE OR MORE VIEWS  DATE/TIME: 11/9/2022 1:29 PM     INDICATION: Bilateral ankle pain.   COMPARISON: None.      Impression    IMPRESSION:  1.  Right ankle: Normal joint spacing and alignment. No fracture.  Moderate calcaneus enthesophytes.  2.  Left ankle: Normal joint spacing and alignment. No fracture. Small  calcaneus enthesophytes.    MACHELLE ABREU MD         SYSTEM ID:  [id number]         Assessment:  1. Heel pain, bilateral    2. Achilles tendinitis of both lower extremities    3. James's deformity of both heels    4. Enthesopathy of ankle        Plan:  Discussed the assessment with the patient.  Follow up: As needed based on clinical progress  5+ years of consistent insertional Achilles  pain  Has exhausted most conservative care options including footwear, custom orthotics, topical anti-inflammatory, systemic anti-inflammatories, RICE, activity modification  Reviewed goal of flexibility and strength and bilateral calf muscles and posterior chain  Physical therapy options reviewed in detail  Reviewed that he has signs of a very mild James's deformity which could predispose to friction at the insertion of the Achilles tendon  XR images independently visualized and reviewed with patient today in clinic  Large enesthophyte over the medial aspect of the insertional Achilles tendon on the right, quite tender to palpation, not amenable to percutaneous tenotomy  After discussion patient would like to discuss future options with the surgeon given the chronicity of the problem  Referral was placed today  We discussed modified progressive pain-free activity as tolerated  Home handouts provided and supportive care reviewed  All questions were answered today  Contact us with additional questions or concerns  Signs and sx of concern reviewed      Fidel Mon DO, CAQ  Sports Medicine Physician  [facility]                  Again, thank you for allowing me to participate in the care of your patient.        Sincerely,        Fidel Mon DO

## 2023-01-01 ENCOUNTER — TRANSFERRED RECORDS (OUTPATIENT)
Dept: MULTI SPECIALTY CLINIC | Facility: CLINIC | Age: 53
End: 2023-01-01

## 2024-01-21 ENCOUNTER — HEALTH MAINTENANCE LETTER (OUTPATIENT)
Age: 54
End: 2024-01-21

## 2025-02-01 ENCOUNTER — HEALTH MAINTENANCE LETTER (OUTPATIENT)
Age: 55
End: 2025-02-01

## 2025-05-01 ENCOUNTER — ANCILLARY PROCEDURE (OUTPATIENT)
Dept: GENERAL RADIOLOGY | Facility: CLINIC | Age: 55
End: 2025-05-01
Attending: PHYSICIAN ASSISTANT
Payer: COMMERCIAL

## 2025-05-01 ENCOUNTER — OFFICE VISIT (OUTPATIENT)
Dept: FAMILY MEDICINE | Facility: CLINIC | Age: 55
End: 2025-05-01
Payer: COMMERCIAL

## 2025-05-01 VITALS
WEIGHT: 160 LBS | TEMPERATURE: 97.5 F | BODY MASS INDEX: 24.25 KG/M2 | SYSTOLIC BLOOD PRESSURE: 114 MMHG | RESPIRATION RATE: 20 BRPM | HEIGHT: 68 IN | HEART RATE: 84 BPM | DIASTOLIC BLOOD PRESSURE: 78 MMHG | OXYGEN SATURATION: 98 %

## 2025-05-01 DIAGNOSIS — M54.50 THORACOLUMBAR BACK PAIN: ICD-10-CM

## 2025-05-01 DIAGNOSIS — M54.6 THORACOLUMBAR BACK PAIN: Primary | ICD-10-CM

## 2025-05-01 DIAGNOSIS — M54.50 THORACOLUMBAR BACK PAIN: Primary | ICD-10-CM

## 2025-05-01 DIAGNOSIS — M54.6 THORACOLUMBAR BACK PAIN: ICD-10-CM

## 2025-05-01 LAB
ALBUMIN UR-MCNC: NEGATIVE MG/DL
APPEARANCE UR: CLEAR
BILIRUB UR QL STRIP: NEGATIVE
COLOR UR AUTO: YELLOW
GLUCOSE UR STRIP-MCNC: NEGATIVE MG/DL
HGB UR QL STRIP: NEGATIVE
KETONES UR STRIP-MCNC: NEGATIVE MG/DL
LEUKOCYTE ESTERASE UR QL STRIP: NEGATIVE
NITRATE UR QL: NEGATIVE
PH UR STRIP: 7 [PH] (ref 5–7)
SP GR UR STRIP: 1.02 (ref 1–1.03)
UROBILINOGEN UR STRIP-ACNC: 0.2 E.U./DL

## 2025-05-01 PROCEDURE — 1125F AMNT PAIN NOTED PAIN PRSNT: CPT | Performed by: PHYSICIAN ASSISTANT

## 2025-05-01 PROCEDURE — 3078F DIAST BP <80 MM HG: CPT | Performed by: PHYSICIAN ASSISTANT

## 2025-05-01 PROCEDURE — 99203 OFFICE O/P NEW LOW 30 MIN: CPT | Performed by: PHYSICIAN ASSISTANT

## 2025-05-01 PROCEDURE — 81003 URINALYSIS AUTO W/O SCOPE: CPT | Performed by: PHYSICIAN ASSISTANT

## 2025-05-01 PROCEDURE — 3074F SYST BP LT 130 MM HG: CPT | Performed by: PHYSICIAN ASSISTANT

## 2025-05-01 RX ORDER — OMEPRAZOLE 20 MG/1
20 CAPSULE, DELAYED RELEASE ORAL DAILY
Qty: 14 CAPSULE | Refills: 0 | Status: SHIPPED | OUTPATIENT
Start: 2025-05-01

## 2025-05-01 RX ORDER — MELOXICAM 15 MG/1
15 TABLET ORAL DAILY
Qty: 14 TABLET | Refills: 0 | Status: SHIPPED | OUTPATIENT
Start: 2025-05-01

## 2025-05-01 RX ORDER — ASCORBIC ACID 1000 MG
1 TABLET ORAL DAILY
COMMUNITY

## 2025-05-01 RX ORDER — TIZANIDINE 2 MG/1
2 TABLET ORAL
Qty: 14 TABLET | Refills: 0 | Status: SHIPPED | OUTPATIENT
Start: 2025-05-01

## 2025-05-01 ASSESSMENT — ENCOUNTER SYMPTOMS: BACK PAIN: 1

## 2025-05-01 ASSESSMENT — PAIN SCALES - GENERAL: PAINLEVEL_OUTOF10: SEVERE PAIN (9)

## 2025-05-01 NOTE — PROGRESS NOTES
Assessment & Plan     Thoracolumbar back pain  - XR Thoracic Lumbar Standing 2 Views; Future  - UA Macroscopic with reflex to Microscopic and Culture - Lab Collect; Future  - UA Macroscopic with reflex to Microscopic and Culture - Lab Collect  - meloxicam (MOBIC) 15 MG tablet; Take 1 tablet (15 mg) by mouth daily.  - omeprazole (PRILOSEC) 20 MG DR capsule; Take 1 capsule (20 mg) by mouth daily.  - tiZANidine (ZANAFLEX) 2 MG tablet; Take 1 tablet (2 mg) by mouth nightly as needed for muscle spasms.    INDICATION:  Thoracolumbar back pain, Thoracolumbar back pain  COMPARISON: None.                                                                      IMPRESSION: Nomenclature based on 12 rib-bearing and 5 lumbar vertebra. Alignment is within normal limits. No displaced fracture. Vertebral body heights are maintained.     Mild degenerative disc disease throughout the lumbar spine. Diffuse lumbar facet   arthropathy.     No significant degenerative changes. No extraspinal abnormality.         Follow-up  In 2-3 weeks if not improving, sooner if worsening. Consider PT vs Pain Management.      Jaleesa Velazco is a 54 year old, presenting for the following health issues:  Back Pain (Since Sunday, middle lower back, wraps around to the front(no urine or bm changes), heat helps a little)      5/1/2025    10:23 AM   Additional Questions   Roomed by susanne   Accompanied by self         5/1/2025    10:23 AM   Patient Reported Additional Medications   Patient reports taking the following new medications OTC ibuprofen and tylenol     Via the Health Maintenance questionnaire, the patient has reported the following services have been completed -Colonscopy: Don t remember 2023-01-01, this information has been sent to the abstraction team.  Slowly started Paul middle of th day no activity progressing bad  Now can feel something in abdomen as well  No changes in urine or stool  Never had before  OTC medications for pain (T and  "I)  NO FCNV  Worst when sitting or bending  Sleeping at night (cannot sleep on side)   Using heating pad and that helps a little bit  Better laying on back, standing or walking      History of Present Illness       Back Pain:  He presents for follow up of back pain. Patient's back pain is a new problem.    Original cause of back pain: not sure  First noticed back pain: 1-4 weeks ago  Patient feels back pain: constantlyLocation of back pain:  Right lower back, left lower back, right middle of back and left middle of back  Description of back pain: sharp, shooting and stabbing  Back pain spreads: nowhere    Since patient first noticed back pain, pain is: gradually worsening  Does back pain interfere with his job:  Yes  On a scale of 1-10 (10 being the worst), patient describes pain as:  9  What makes back pain worse: bending, certain positions and sitting   Acupuncture: not tried  Acetaminophen: not tried  Activity or exercise: not tried  Chiropractor:  Not tried  Heat: helpful  Massage: not tried  Muscle relaxants: not tried  NSAIDS: helpful  Opioids: not tried  Physical Therapy: not tried  Rest: helpful  Steroid Injection: not tried  Stretching: helpful  Surgery: not tried  TENS unit: not tried  Topical pain relievers: helpful  Other healthcare providers patient is seeing for back pain: None   He is taking medications regularly.          {ROS Picklists (Optional):213012}      Objective    /78   Pulse 84   Temp 97.5  F (36.4  C) (Tympanic)   Resp 20   Ht 1.715 m (5' 7.5\")   Wt 72.6 kg (160 lb)   SpO2 98%   BMI 24.69 kg/m    Body mass index is 24.69 kg/m .  Physical Exam   {Exam List (Optional):572071}    {Diagnostic Test Results (Optional):052453}        Signed Electronically by: ANDREW FIGUEROA PA-C  {Email feedback regarding this note to [email or web address]   :432450}  " is flat.      Palpations: Abdomen is soft.      Tenderness: There is no abdominal tenderness. There is no right CVA tenderness or left CVA tenderness.   Musculoskeletal:      Cervical back: Normal.      Thoracic back: Tenderness present.      Lumbar back: Tenderness present. Negative right straight leg raise test and negative left straight leg raise test.        Back:       Comments: Paraspinous muscles TTP.    Neurological:      General: No focal deficit present.      Mental Status: He is alert and oriented to person, place, and time.   Psychiatric:         Mood and Affect: Mood normal.         Behavior: Behavior normal.         Thought Content: Thought content normal.         Judgment: Judgment normal.            Signed Electronically by: ANDREW FIGUEROA PA-C

## 2025-07-11 ENCOUNTER — OFFICE VISIT (OUTPATIENT)
Dept: FAMILY MEDICINE | Facility: CLINIC | Age: 55
End: 2025-07-11
Payer: COMMERCIAL

## 2025-07-11 VITALS
RESPIRATION RATE: 20 BRPM | DIASTOLIC BLOOD PRESSURE: 74 MMHG | BODY MASS INDEX: 25.27 KG/M2 | SYSTOLIC BLOOD PRESSURE: 119 MMHG | OXYGEN SATURATION: 98 % | WEIGHT: 161 LBS | HEIGHT: 67 IN | HEART RATE: 78 BPM | TEMPERATURE: 97.5 F

## 2025-07-11 DIAGNOSIS — M54.6 THORACOLUMBAR BACK PAIN: ICD-10-CM

## 2025-07-11 DIAGNOSIS — Z00.00 ROUTINE GENERAL MEDICAL EXAMINATION AT A HEALTH CARE FACILITY: Primary | ICD-10-CM

## 2025-07-11 DIAGNOSIS — Z12.5 SCREENING FOR PROSTATE CANCER: ICD-10-CM

## 2025-07-11 DIAGNOSIS — Z13.1 SCREENING FOR DIABETES MELLITUS: ICD-10-CM

## 2025-07-11 DIAGNOSIS — M54.50 THORACOLUMBAR BACK PAIN: ICD-10-CM

## 2025-07-11 DIAGNOSIS — Z13.6 SCREENING FOR CARDIOVASCULAR CONDITION: ICD-10-CM

## 2025-07-11 PROCEDURE — 1126F AMNT PAIN NOTED NONE PRSNT: CPT | Performed by: PHYSICIAN ASSISTANT

## 2025-07-11 PROCEDURE — 3074F SYST BP LT 130 MM HG: CPT | Performed by: PHYSICIAN ASSISTANT

## 2025-07-11 PROCEDURE — 3078F DIAST BP <80 MM HG: CPT | Performed by: PHYSICIAN ASSISTANT

## 2025-07-11 PROCEDURE — 99396 PREV VISIT EST AGE 40-64: CPT | Performed by: PHYSICIAN ASSISTANT

## 2025-07-11 RX ORDER — MELOXICAM 15 MG/1
15 TABLET ORAL DAILY
Qty: 60 TABLET | Refills: 0 | Status: SHIPPED | OUTPATIENT
Start: 2025-07-11

## 2025-07-11 SDOH — HEALTH STABILITY: PHYSICAL HEALTH: ON AVERAGE, HOW MANY MINUTES DO YOU ENGAGE IN EXERCISE AT THIS LEVEL?: 20 MIN

## 2025-07-11 SDOH — HEALTH STABILITY: PHYSICAL HEALTH: ON AVERAGE, HOW MANY DAYS PER WEEK DO YOU ENGAGE IN MODERATE TO STRENUOUS EXERCISE (LIKE A BRISK WALK)?: 4 DAYS

## 2025-07-11 ASSESSMENT — PAIN SCALES - GENERAL: PAINLEVEL_OUTOF10: NO PAIN (0)

## 2025-07-11 ASSESSMENT — SOCIAL DETERMINANTS OF HEALTH (SDOH): HOW OFTEN DO YOU GET TOGETHER WITH FRIENDS OR RELATIVES?: ONCE A WEEK

## 2025-07-11 ASSESSMENT — ENCOUNTER SYMPTOMS: BACK PAIN: 1

## 2025-07-11 NOTE — PATIENT INSTRUCTIONS
Patient Education   Preventive Care Advice   This is general advice given by our system to help you stay healthy. However, your care team may have specific advice just for you. Please talk to your care team about your preventive care needs.  Nutrition  Eat 5 or more servings of fruits and vegetables each day.  Try wheat bread, brown rice and whole grain pasta (instead of white bread, rice, and pasta).  Get enough calcium and vitamin D. Check the label on foods and aim for 100% of the RDA (recommended daily allowance).  Lifestyle  Exercise at least 150 minutes each week  (30 minutes a day, 5 days a week).  Do muscle strengthening activities 2 days a week. These help control your weight and prevent disease.  No smoking.  Wear sunscreen to prevent skin cancer.  Have a dental exam and cleaning every 6 months.  Yearly exams  See your health care team every year to talk about:  Any changes in your health.  Any medicines your care team has prescribed.  Preventive care, family planning, and ways to prevent chronic diseases.  Shots (vaccines)   HPV shots (up to age 26), if you've never had them before.  Hepatitis B shots (up to age 59), if you've never had them before.  COVID-19 shot: Get this shot when it's due.  Flu shot: Get a flu shot every year.  Tetanus shot: Get a tetanus shot every 10 years.  Pneumococcal, hepatitis A, and RSV shots: Ask your care team if you need these based on your risk.  Shingles shot (for age 50 and up)  General health tests  Diabetes screening:  Starting at age 35, Get screened for diabetes at least every 3 years.  If you are younger than age 35, ask your care team if you should be screened for diabetes.  Cholesterol test: At age 39, start having a cholesterol test every 5 years, or more often if advised.  Bone density scan (DEXA): At age 50, ask your care team if you should have this scan for osteoporosis (brittle bones).  Hepatitis C: Get tested at least once in your life.  STIs (sexually  transmitted infections)  Before age 24: Ask your care team if you should be screened for STIs.  After age 24: Get screened for STIs if you're at risk. You are at risk for STIs (including HIV) if:  You are sexually active with more than one person.  You don't use condoms every time.  You or a partner was diagnosed with a sexually transmitted infection.  If you are at risk for HIV, ask about PrEP medicine to prevent HIV.  Get tested for HIV at least once in your life, whether you are at risk for HIV or not.  Cancer screening tests  Cervical cancer screening: If you have a cervix, begin getting regular cervical cancer screening tests starting at age 21.  Breast cancer scan (mammogram): If you've ever had breasts, begin having regular mammograms starting at age 40. This is a scan to check for breast cancer.  Colon cancer screening: It is important to start screening for colon cancer at age 45.  Have a colonoscopy test every 10 years (or more often if you're at risk) Or, ask your provider about stool tests like a FIT test every year or Cologuard test every 3 years.  To learn more about your testing options, visit:   .  For help making a decision, visit:   https://bit.ly/nh79747.  Prostate cancer screening test: If you have a prostate, ask your care team if a prostate cancer screening test (PSA) at age 55 is right for you.  Lung cancer screening: If you are a current or former smoker ages 50 to 80, ask your care team if ongoing lung cancer screenings are right for you.  For informational purposes only. Not to replace the advice of your health care provider. Copyright   2023 [employer]. All rights reserved. Clinically reviewed by the [facility]itions [facility]. [place] 013698 - REV 01/24.

## 2025-07-11 NOTE — PROGRESS NOTES
"Preventive Care Visit  [facility]  ANDREW FIGUEROA PA-C, Physician Assistant - Medical  Jul 11, 2025      Assessment & Plan     Routine general medical examination at a health care facility  Recommend twice yearly dental exams  Recommend every 2 year eye exams, annually if wearing corrective lenses  Recommend Calcium 1000mg daily either obtained in a 500mg twice daily supplement or through diet (or a combination of both).   Recommend 30-60 minutes cardiovascular exercise every day outside of usual activity and sveral days/week strength exercises.  Drink 40-60 ounces water daily  Try to get 4-5 servings fruits/vegetable daily  Eat quality lean proteins and high fiber whole foods  Try to stay away from processed or packaged foods    - CBC with platelets; Future  - Comprehensive metabolic panel (BMP + Alb, Alk Phos, ALT, AST, Total. Bili, TP); Future    F/U annually for wellness exam and in the interim as needed for problem visits.    Screening for diabetes mellitus  - Hemoglobin A1c; Future    Screening for cardiovascular condition  - Lipid Profile (Chol, Trig, HDL, LDL calc); Future    Thoracolumbar back pain  Refilled medication. Consider PT. Improving.   - meloxicam (MOBIC) 15 MG tablet; Take 1 tablet (15 mg) by mouth daily.    Screening for prostate cancer  - PSA, screen; Future      BMI  Estimated body mass index is 25.03 kg/m  as calculated from the following:    Height as of this encounter: 1.708 m (5' 7.25\").    Weight as of this encounter: 73 kg (161 lb).       Counseling  Appropriate preventive services were addressed with this patient via screening, questionnaire, or discussion as appropriate for fall prevention, nutrition, physical activity, Tobacco-use cessation, social engagement, weight loss and cognition.  Checklist reviewing preventive services available has been given to the patient.  Reviewed patient's diet, addressing concerns and/or questions.   The patient was instructed " to see the dentist every 6 months.           Jaleesa Velazco is a 54 year old, presenting for the following:  Physical and Back Pain        7/11/2025     3:44 PM   Additional Questions   Roomed by susanne   Accompanied by self         7/11/2025     3:44 PM   Patient Reported Additional Medications   Patient reports taking the following new medications see chart          Well Male Exam:    Prostate Cancer Screening: Due  STD Screening: No concerns  Colon Cancer Screening: colonoscopy 2023, 10 yr repeat    Immunizations: Declines today. Will get them at a later date.     Cholesterol Screening: Due  Diabetes Screening: Due    Depression and Anxiety Screening: No concerns. See below.     Other Concerns: Continues to have back pain but significant improvement. Is doing exercises on his own. Has not gone to formal PT. Does not take tizanidine often. Would like a refill of the meloxicam as he does find it is helpful. No chest pain of abdominal pain. Is walking 30-40 minutes daily because he works from home and feels that the back issue started when he became more sedentary during [employer]. No other concerns.         Back Pain       Advance Care Planning    Discussed advance care planning with patient; informed AVS has link to [facility].        7/11/2025   General Health   How would you rate your overall physical health? Good   Feel stress (tense, anxious, or unable to sleep) Only a little   (!) STRESS CONCERN      7/11/2025   Nutrition   Three or more servings of calcium each day? Yes   Diet: Regular (no restrictions)   How many servings of fruit and vegetables per day? (!) 2-3   How many sweetened beverages each day? 0-1         7/11/2025   Exercise   Days per week of moderate/strenous exercise 4 days   Average minutes spent exercising at this level 20 min         7/11/2025   Social Factors   Frequency of gathering with friends or relatives Once a week   Worry food won't last until get money to buy more No    Food not last or not have enough money for food? No   Do you have housing? (Housing is defined as stable permanent housing and does not include staying outside in a car, in a tent, in an abandoned building, in an overnight shelter, or couch-surfing.) Yes   Are you worried about losing your housing? No   Lack of transportation? No   Unable to get utilities (heat,electricity)? No         7/11/2025   Fall Risk   Fallen 2 or more times in the past year? No   Trouble with walking or balance? No          7/11/2025   Dental   Dentist two times every year? (!) NO           Today's PHQ-2 Score:       5/1/2025     9:03 AM   PHQ-2 ( 1999 [employer])   Q1: Little interest or pleasure in doing things 1   Q2: Feeling down, depressed or hopeless 0   PHQ-2 Score 1    Q1: Little interest or pleasure in doing things Several days   Q2: Feeling down, depressed or hopeless Not at all   PHQ-2 Score 1       Patient-reported         7/11/2025   Substance Use   Alcohol more than 3/day or more than 7/wk Not Applicable   Do you use any other substances recreationally? No     Social History     Tobacco Use    Smoking status: Never     Passive exposure: Never    Smokeless tobacco: Never   Vaping Use    Vaping status: Never Used   Substance Use Topics    Alcohol use: No    Drug use: No           7/11/2025   STI Screening   New sexual partner(s) since last STI/HIV test? No   ASCVD Risk   The ASCVD Risk score (Olivia SMALL, et al., 2019) failed to calculate for the following reasons:    Cannot find a previous HDL lab    Cannot find a previous total cholesterol lab       Reviewed and updated as needed this visit by Provider   Tobacco  Allergies  Meds  Problems  Med Hx  Surg Hx  Fam Hx            History reviewed. No pertinent past medical history.  Past Surgical History:   Procedure Laterality Date    ABDOMEN SURGERY      ZZC APPENDECTOMY  [employer]     BP Readings from Last 3 Encounters:   07/11/25 119/74   05/01/25 114/78   11/09/22  "112/74    Wt Readings from Last 3 Encounters:   07/11/25 73 kg (161 lb)   05/01/25 72.6 kg (160 lb)   11/09/22 81.2 kg (179 lb)                  Patient Active Problem List   Diagnosis    CARDIOVASCULAR SCREENING; LDL GOAL LESS THAN 130     Past Surgical History:   Procedure Laterality Date    ABDOMEN SURGERY      ZZC APPENDECTOMY  High School       Social History     Tobacco Use    Smoking status: Never     Passive exposure: Never    Smokeless tobacco: Never   Substance Use Topics    Alcohol use: No     Family History   Problem Relation Age of Onset    Hypertension Mother         Evelyn    Diabetes Mother     Hyperlipidemia Mother     Hypertension Father     Lipids Father          Current Outpatient Medications   Medication Sig Dispense Refill    MAGNESIUM PO Take 250 mg by mouth.      meloxicam (MOBIC) 15 MG tablet Take 1 tablet (15 mg) by mouth daily. 60 tablet 0    Multiple Vitamins-Minerals (MENS 50+ MULTIVITAMIN) TABS Take 1 tablet by mouth daily.      omeprazole (PRILOSEC) 20 MG DR capsule Take 1 capsule (20 mg) by mouth daily. (Patient not taking: Reported on 7/11/2025) 14 capsule 0     No Known Allergies  Recent Labs   Lab Test 07/10/18  0747 06/29/18  0729   LDL  --  170*   HDL  --  49   TRIG  --  217*   CR 0.93  --    GFRESTIMATED 88  --    GFRESTBLACK >90  --    POTASSIUM 3.9  --                Review of Systems  Constitutional, HEENT, cardiovascular, pulmonary, GI, , musculoskeletal, neuro, skin, endocrine and psych systems are negative, except as otherwise noted.     Objective    Exam  /74   Pulse 78   Temp 97.5  F (36.4  C) (Tympanic)   Resp 20   Ht 1.708 m (5' 7.25\")   Wt 73 kg (161 lb)   SpO2 98%   BMI 25.03 kg/m     Estimated body mass index is 25.03 kg/m  as calculated from the following:    Height as of this encounter: 1.708 m (5' 7.25\").    Weight as of this encounter: 73 kg (161 lb).    Physical Exam  Vitals and nursing note reviewed.   Constitutional:       Appearance: Normal " appearance. He is not ill-appearing.   HENT:      Head: Normocephalic.      Right Ear: Tympanic membrane, ear canal and external ear normal.      Left Ear: Tympanic membrane, ear canal and external ear normal.      Ears:      Comments: Hearing grossly intact.      Nose: No rhinorrhea.      Mouth/Throat:      Lips: Pink.      Mouth: Mucous membranes are moist.      Dentition: Normal dentition.      Tongue: No lesions.      Tonsils: No tonsillar exudate. 2+ on the right. 2+ on the left.   Eyes:      General: Lids are normal.      Extraocular Movements: Extraocular movements intact.      Conjunctiva/sclera: Conjunctivae normal.      Pupils: Pupils are equal, round, and reactive to light.   Neck:      Thyroid: No thyroid mass, thyromegaly or thyroid tenderness.      Trachea: Trachea normal.   Cardiovascular:      Rate and Rhythm: Normal rate and regular rhythm.      Pulses:           Posterior tibial pulses are 2+ on the right side and 2+ on the left side.      Heart sounds: Normal heart sounds. No murmur heard.  Pulmonary:      Effort: Pulmonary effort is normal.      Breath sounds: Normal breath sounds.   Abdominal:      General: Abdomen is flat. Bowel sounds are normal.      Palpations: Abdomen is soft.      Tenderness: There is no abdominal tenderness.      Hernia: No hernia is present.   Genitourinary:     Comments: Deferred  Musculoskeletal:      Cervical back: Normal range of motion.      Right lower leg: No edema.      Left lower leg: No edema.      Comments: Ambulatory without assistive Device  Moving all limbs with grossly normal AROM   Lymphadenopathy:      Head:      Right side of head: No submental, submandibular, tonsillar or preauricular adenopathy.      Left side of head: No submental, submandibular, tonsillar or preauricular adenopathy.      Cervical: No cervical adenopathy.      Upper Body:      Right upper body: No supraclavicular adenopathy.   Skin:     General: Skin is warm and dry.      Capillary  Refill: Capillary refill takes 2 to 3 seconds.      Findings: No lesion, rash or wound.      Nails: There is no clubbing.   Neurological:      Mental Status: He is alert.      Motor: Motor function is intact.      Coordination: Coordination is intact.      Gait: Gait is intact.      Deep Tendon Reflexes:      Reflex Scores:       Patellar reflexes are 2+ on the right side and 2+ on the left side.     Comments: CN II-XII grossly intact with facial symmetry   Psychiatric:         Behavior: Behavior is cooperative.         Signed Electronically by: ANDREW FIGUEROA PA-C

## 2025-07-31 ENCOUNTER — LAB (OUTPATIENT)
Dept: LAB | Facility: CLINIC | Age: 55
End: 2025-07-31
Payer: COMMERCIAL

## 2025-07-31 DIAGNOSIS — Z12.5 SCREENING FOR PROSTATE CANCER: ICD-10-CM

## 2025-07-31 DIAGNOSIS — Z13.1 SCREENING FOR DIABETES MELLITUS: ICD-10-CM

## 2025-07-31 DIAGNOSIS — Z13.6 SCREENING FOR CARDIOVASCULAR CONDITION: ICD-10-CM

## 2025-07-31 DIAGNOSIS — Z00.00 ROUTINE GENERAL MEDICAL EXAMINATION AT A HEALTH CARE FACILITY: ICD-10-CM

## 2025-07-31 LAB
ALBUMIN SERPL BCG-MCNC: 4.2 G/DL (ref 3.5–5.2)
ALP SERPL-CCNC: 57 U/L (ref 40–150)
ALT SERPL W P-5'-P-CCNC: 26 U/L (ref 0–70)
ANION GAP SERPL CALCULATED.3IONS-SCNC: 7 MMOL/L (ref 7–15)
AST SERPL W P-5'-P-CCNC: 26 U/L (ref 0–45)
BILIRUB SERPL-MCNC: 0.7 MG/DL
BUN SERPL-MCNC: 14.2 MG/DL (ref 6–20)
CALCIUM SERPL-MCNC: 9.1 MG/DL (ref 8.8–10.4)
CHLORIDE SERPL-SCNC: 103 MMOL/L (ref 98–107)
CHOLEST SERPL-MCNC: 251 MG/DL
CREAT SERPL-MCNC: 1.05 MG/DL (ref 0.67–1.17)
EGFRCR SERPLBLD CKD-EPI 2021: 85 ML/MIN/1.73M2
ERYTHROCYTE [DISTWIDTH] IN BLOOD BY AUTOMATED COUNT: 12.1 % (ref 10–15)
EST. AVERAGE GLUCOSE BLD GHB EST-MCNC: 103 MG/DL
FASTING STATUS PATIENT QL REPORTED: YES
FASTING STATUS PATIENT QL REPORTED: YES
GLUCOSE SERPL-MCNC: 92 MG/DL (ref 70–99)
HBA1C MFR BLD: 5.2 % (ref 0–5.6)
HCO3 SERPL-SCNC: 27 MMOL/L (ref 22–29)
HCT VFR BLD AUTO: 48.4 % (ref 40–53)
HDLC SERPL-MCNC: 52 MG/DL
HGB BLD-MCNC: 16.6 G/DL (ref 13.3–17.7)
LDLC SERPL CALC-MCNC: 173 MG/DL
MCH RBC QN AUTO: 29.7 PG (ref 26.5–33)
MCHC RBC AUTO-ENTMCNC: 34.3 G/DL (ref 31.5–36.5)
MCV RBC AUTO: 87 FL (ref 78–100)
NONHDLC SERPL-MCNC: 199 MG/DL
PLATELET # BLD AUTO: 271 10E3/UL (ref 150–450)
POTASSIUM SERPL-SCNC: 4.2 MMOL/L (ref 3.4–5.3)
PROT SERPL-MCNC: 6.7 G/DL (ref 6.4–8.3)
PSA SERPL DL<=0.01 NG/ML-MCNC: 0.62 NG/ML (ref 0–3.5)
RBC # BLD AUTO: 5.59 10E6/UL (ref 4.4–5.9)
SODIUM SERPL-SCNC: 137 MMOL/L (ref 135–145)
TRIGL SERPL-MCNC: 131 MG/DL
WBC # BLD AUTO: 5.6 10E3/UL (ref 4–11)